# Patient Record
(demographics unavailable — no encounter records)

---

## 2025-01-06 NOTE — PHYSICAL EXAM
[Well Nourished] : well nourished [Alert] : ~L alert [Active] : active [No Allergic Shiners] : no allergic shiners [Tympanic Membranes Clear] : tympanic membranes were clear [No Nasal Drainage] : no nasal drainage [Non-Erythematous] : non-erythematous [No Exudates] : no exudates [Tonsil Size ___] : tonsil size [unfilled] [No Tonsillar Enlargement] : no tonsillar enlargement [Absence Of Retractions] : absence of retractions [No Acc Muscle Use] : no accessory muscle use [Good aeration to bases] : good aeration to bases [Equal Breath Sounds] : equal breath sounds bilaterally [No Crackles] : no crackles [No Rhonchi] : no rhonchi [No Wheezing] : no wheezing [Normal Sinus Rhythm] : normal sinus rhythm

## 2025-01-06 NOTE — HISTORY OF PRESENT ILLNESS
[Stable] : are stable [Wheezing] : wheezing [Difficulty Breathing During Exertion] : dyspnea on exertion [Nasal Passage Blockage (Stuffiness)] : nasal congestion [Fever] : fever [Cough] : coughing [URI] : upper respiratory tract infection [Adherent] : the patient is adherent with ~his/her~ medication regimen [FreeTextEntry1] : 4yo F with h/o ASD, ADHD and RAD here for follow-up. Per mother, patient had a URI 2 months ago, only required use of albuterol for 2-3 days. Patient had persistent symptoms including throat clearing which ahs now improved. Mother giving cetirizine as needed when patient is coughing, last used 1 week ago. Reports compliance with daily Fluticasone. Denies fever or congestion. No recent ED visits or hospitalizations.  [More Frequent Use Needed Recently] : Patient reports no recent increase in frequency of

## 2025-01-06 NOTE — ASSESSMENT
[FreeTextEntry1] : 6yo F with h/o ASD, ADHD and RAD here for follow-up. Patient compliant with medications, and only using albuterol when needed. No current RAD flare.   Problems and Plans ### persistent asthma: daily controller ### exercise asthma : albuterol prior ### allergic rhinitis: nasal steroid Rx, 3 rd gen antihistamine ### eczema: topical steroid prn - Continue Fluticasone BID - Continue Cetirizine PRN and albuterol PRN  - Follow up in 3-4 months

## 2025-02-14 NOTE — PHYSICAL EXAM
[External ears normal] : external ears normal [Normal] : patient has a normal gait [Attention Intact] : attention intact [Easily Distracted] : easily distracted [Needs frequent redirecting] : needs frequent redirecting [Able to redirect] : able to redirect [Difficulty shifting attention or transitioning] : no difficulty shifting attention or transitioning [Fidgets] : fidgets [Moves quickly from one activity to another] : moves quickly from one activity to another [Well-behaved during visit] : not well-behaved during visit [Oppositional] : oppositional [Cooperative when examined] : cooperative when examined [Appropriate eye contact] : appropriate eye contact [Smiles responsively] : smiles responsively [Quiet/calm] : not quiet/calm [Positive mood] : positive mood [Negative mood] : no negative mood [Hypersensitive] : hypersensitive [Answered questions appropriately] : did not answer questions appropriately [Responds to name] : responds to name [Able to follow one step commands] : able to follow one step commands [Echolalia] : no echolalia [Difficult to engage in play] : not difficult to engage in play [de-identified] : -- .ALEX presented with self-directed, attention seeking behaviors. She interupts often seeking attention (showing her dress, asking repetitive questions or rote language). When attention given and simple questions asked, .ALEX is unable to answer replying, "I don't know". .ALEX is quick to whine when limits are set but will stop when instructed by parents or provider. -- .ALEX continued with attention seeking behaviors and when not noticed, began to scream repeatedly. When she paused and noticed no one addressed her behavior she continued and screamed louder. With redirection, .ALEX would pause and say, "I don't want to". At the end of the visit, she was asked to put on her jacket to leave and she then became oppositional replying, "no i don't want to".

## 2025-02-14 NOTE — PLAN
[Findings (To Date)] : Findings from evaluation (to date) [Co-Morbidities] : Clinical disorders and problem commonly associated with this child's condition (now or in the future) [Prognosis] : Prognosis [Dev. Therapies: ____] : Benefits and limits of developmental therapies: [unfilled] [Behavior Modification] : Behavior modification strategies [Resources] : Other available resources [School Re-Eval] : School district re-evaluation [Family Questions] : Family's questions were addressed [Diet] : Evidence-based clinical information about diet [Sleep] : The importance of sleep and strategies to ensure adequate sleep [Media / Screen Time] : Importance of limiting electronics, media, and screen time [Exercise] : Regular exercise [Reading] : Importance of daily reading [Injury Prevention] : injury prevention [Rationale for Medication Discussed] : The rationale for treating inattention, distractibility, hyperactivity, or impulsivity with medication was discussed. The desired effects, possible side effects, and need for monitoring response were reviewed. Information about various medication options was provided.  The option of not treating with medication was also discussed. [Cardiac risk factors for treatment] : Cardiac risk factors for treatment of stimulant medications were reviewed, including history of prior seizure, unexplained loss of consciousness, congenital heart disease, arrhythmias, or family history of sudden unexplained cardiac death in family members below the age of 40. [Medication Deferred] : After discussion with the family, the decision was made to defer consideration of treatment with medication. [FreeTextEntry1] :  Autism-- continue with current IEP and supports. Referral given to parents for CPSE Team to provide SHANICEs [employer] evaluations and 2024-25 IEP in BOTH [place] & English for review. Reinforced the benefits of Home LUIZ & Social Skills programs to help with SHANICEs oppositional behaviors. Parents agreed and local list of LUIZ therapist given. They will call if they need a referral once they find a center that accepts their insurance.   Hyperactivity--Advised to incorporate classroom supports and modifications in NANDO's  IEP development. Reinforced consistent limit setting/boundaries at home. Will continue to monitor.  Speech-- continue with  NANDO's therapy services per her IEP for continued development of articulation and Pragmatic Language Skill development.   Topics discussed with parent, refer to counseling section of note.  .Parent is aware to call the office as needed should any concerns or questions arise otherwise return in 6-8 months.      [Other: _____] : [unfilled] [CSE / IEP] : Committee on Special Education (CSE) evaluations and Individualized Education Programs (IEP)

## 2025-02-14 NOTE — REVIEW OF SYSTEMS
[Wgt Gain] : recent weight gain [Difficulty Falling Asleep] : no difficulty falling asleep [Difficulty Remaining Asleep] : no difficulty remaining asleep [Moodiness] : moodiness [Irritability] : irritability [Normal] : Hematologic/Lymphatic [de-identified] : oppositional-defiant behaviors

## 2025-02-14 NOTE — REASON FOR VISIT
[Follow-Up Visit] : a follow-up visit for [Autism Spectrum Disorder] : autism spectrum disorder [Hyperactivity] : hyperactivity [Attention Problems] : attention problems [Progress with Services] : progress with services [Speech/Language] : speech/language problems [Patient] : patient [Mother] : mother [Father] : father [Other: _____] : [unfilled] [FreeTextEntry4] : NONE [FreeTextEntry3] : Oct 23, 2024

## 2025-02-14 NOTE — REVIEW OF SYSTEMS
[Wgt Gain] : recent weight gain [Difficulty Falling Asleep] : no difficulty falling asleep [Difficulty Remaining Asleep] : no difficulty remaining asleep [Moodiness] : moodiness [Irritability] : irritability [Normal] : Hematologic/Lymphatic [de-identified] : oppositional-defiant behaviors

## 2025-02-14 NOTE — PLAN
[Findings (To Date)] : Findings from evaluation (to date) [Co-Morbidities] : Clinical disorders and problem commonly associated with this child's condition (now or in the future) [Prognosis] : Prognosis [Dev. Therapies: ____] : Benefits and limits of developmental therapies: [unfilled] [Behavior Modification] : Behavior modification strategies [Resources] : Other available resources [School Re-Eval] : School district re-evaluation [Family Questions] : Family's questions were addressed [Diet] : Evidence-based clinical information about diet [Sleep] : The importance of sleep and strategies to ensure adequate sleep [Media / Screen Time] : Importance of limiting electronics, media, and screen time [Exercise] : Regular exercise [Reading] : Importance of daily reading [Injury Prevention] : injury prevention [Rationale for Medication Discussed] : The rationale for treating inattention, distractibility, hyperactivity, or impulsivity with medication was discussed. The desired effects, possible side effects, and need for monitoring response were reviewed. Information about various medication options was provided.  The option of not treating with medication was also discussed. [Cardiac risk factors for treatment] : Cardiac risk factors for treatment of stimulant medications were reviewed, including history of prior seizure, unexplained loss of consciousness, congenital heart disease, arrhythmias, or family history of sudden unexplained cardiac death in family members below the age of 40. [Medication Deferred] : After discussion with the family, the decision was made to defer consideration of treatment with medication. [FreeTextEntry1] :  Autism-- continue with current IEP and supports. Referral given to parents for CPSE Team to provide SHANICEs [employer] evaluations and 2024-25 IEP in BOTH [place] & English for review. Reinforced the benefits of Home LUIZ & Social Skills programs to help with SHANICEs oppositional behaviors. Parents agreed and local list of LUIZ therapist given. They will call if they need a referral once they find a center that accepts their insurance.   Hyperactivity--Advised to incorporate classroom supports and modifications in NANDO's  IEP development. Reinforced consistent limit setting/boundaries at home. Will continue to monitor.  Speech-- continue with  NANDO's therapy services per her IEP for continued development of articulation and Pragmatic Language Skill development.   Topics discussed with parent, refer to counseling section of note.  .Parent is aware to call the office as needed should any concerns or questions arise otherwise return in 6-8 months.      [Other: _____] : [unfilled] [CSE / IEP] : Committee on Special Education (CSE) evaluations and Individualized Education Programs (IEP)

## 2025-02-14 NOTE — PHYSICAL EXAM
[External ears normal] : external ears normal [Normal] : patient has a normal gait [Attention Intact] : attention intact [Easily Distracted] : easily distracted [Needs frequent redirecting] : needs frequent redirecting [Able to redirect] : able to redirect [Difficulty shifting attention or transitioning] : no difficulty shifting attention or transitioning [Fidgets] : fidgets [Moves quickly from one activity to another] : moves quickly from one activity to another [Well-behaved during visit] : not well-behaved during visit [Oppositional] : oppositional [Cooperative when examined] : cooperative when examined [Appropriate eye contact] : appropriate eye contact [Smiles responsively] : smiles responsively [Quiet/calm] : not quiet/calm [Positive mood] : positive mood [Negative mood] : no negative mood [Hypersensitive] : hypersensitive [Answered questions appropriately] : did not answer questions appropriately [Responds to name] : responds to name [Able to follow one step commands] : able to follow one step commands [Echolalia] : no echolalia [Difficult to engage in play] : not difficult to engage in play [de-identified] : -- .ALEX presented with self-directed, attention seeking behaviors. She interupts often seeking attention (showing her dress, asking repetitive questions or rote language). When attention given and simple questions asked, .ALEX is unable to answer replying, "I don't know". .ALEX is quick to whine when limits are set but will stop when instructed by parents or provider. -- .ALEX continued with attention seeking behaviors and when not noticed, began to scream repeatedly. When she paused and noticed no one addressed her behavior she continued and screamed louder. With redirection, .ALEX would pause and say, "I don't want to". At the end of the visit, she was asked to put on her jacket to leave and she then became oppositional replying, "no i don't want to".

## 2025-02-14 NOTE — HISTORY OF PRESENT ILLNESS
[Entering in September] : entering in September [Public] : Public [SC: _____] : self-contained [unfilled] [IEP] : Individualized Education Program [Other: ____] : [unfilled] [PWD] : Preschooler with a Disability [OT: ____] : Occupational Therapy [unfilled] [S-L: _____] : Speech/Language Therapy [unfilled] [BIP] : Behavior Intervention Plan [TA: Other] : Other testing accommodations [12 mos.] : 12 - Month Special Service and/or Program: Yes [Spec. Transportation] : Special Transportation: Yes [No Major Concerns] : No major concerns [Doing Well] : Doing well [FreeTextEntry4] : Sept 2024-- attends [employer]   [FreeTextEntry3] : dated 2/28/2023 (scanned into EMR). Annual review scheduled for 2/27/2024 [FreeTextEntry1] : 2024-25 School Year-- .ALEX attends a full five day in-person learning schedule    [TWNoteComboBox1] :  [de-identified] : improving. Reviewed the state assessment results for NANDO which parents brought. In the beginning of the school year (Fall 2024), NANDO was below grade level in Reading and the Winter 2025 assessment placed her on grade leve and a little above. In math .ALEX was moderately below grade leve in the Fall 2024. By Winter 2025 assesment, NANDO is "emerging" towards grade level.  [de-identified] : Teachers report .ALEX is well behaved in school [de-identified] : No concerns with diet, elimination or sleep.  [de-identified] : .ALEX is oppositional, attention seeking and displays a low frustration level when not getting her way or attention. She then tantrums often with screaming episodes.  [de-identified] : .ALEX will scream at her teenage brother when not given her way or attentoin. [de-identified] : low frustration tolerance with minimal coping strategies or observance of limits & boundaries. [FreeTextEntry6] : Denies any recent illness, accidents, ER visits or hospitalizations since last visit.

## 2025-04-10 NOTE — CONSULT LETTER
[Dear  ___] : Dear  [unfilled], [FreeTextEntry2] : Kate Alcantar MD [address] [place] [address] [FreeTextEntry1] :     04/10/2025   Kate Alcantar MD [address] [place] [address]     RE: ALEX MORRIS MRN:[id number] :2019      Dear Dr. Alcantar       I had the pleasure of meeting ALEX MORRIS and her parents for an initial visit on 04/10/2025. As you know, ALEX is a previously healthy 5 year -year-old girl with autism spectrum disorder who presented with a history of abdominal pain for the past 4 weeks. Reportedly she developed abdominal pain shortly after she had viral illness. Her pain is located in the periumbilical area, described as sharp, non radiating and intermittent in character occurring almost daily. She reportedly had two episodes of vomiting that is described as forceful, non bilious nature. There is no history of diarrhea, fever, weight loss, sleep disturbances, nocturnal awakening, feeding refusal behaviors, irritability. There is a history of increased gas, abdominal distention, flatulence and burping. Her appetite is reported as excellent and her portion size has remained unchanged. Her activity has remained unchanged as well. Her dietary history showed that she consumes carbohydrate predominant diet with not enough fruits and vegetables. She loves sweets, dairy products. For example, she eats yogurt 3 times per day, ice cream twice per week, pizza once a week. Her water intake reportedly is excellent - 16 ounces bottle refilling 5-6 times per day. Her Bms are described as 2 times per day, type3-4, brown in color, small in quantity, and not associated with difficulty. There is no visible blood or mucus in her stools.   Past Medical History: ALEX MORRIS was the product of full term pregnancy with birth weight of 8 pounds ounces and delivered by . Pregnancy was complicated by gestational diabetes, and delivery were described as unremarkable. Information regarding meconium passage is unknown. ALEX MORRIS  was exclusively formula fed and did well during infancy. There is no history of food allergy. she has a history of asthma or eczema. There is no history of past surgery or hospitalization.   Family Medical History: There is no history of celiac disease, type 1 DM, hypothyroidism, IBD or food allergies. The family is of [place] descent.    Recent Test Results:   Review of Systems: 14 point ROS was negative except as noted above.   Physical Examination: General NAD HEENT Atraumatic, normocephaly, PERRLA, red reflex present, EOM normal Neck Supple, no lymphadenopathy or masses Chest Clear to auscultation, no rales or wheezes, RRR, no murmur Abdomen Soft, distended, non-tender, no hepatosplenomegaly, no masses  Normal Joints Normal Neurology Age appropriate reflexes present. DTRs normal. No focal deficits Dermatology No cyanosis, rash, abnormal pigmented lesions Rectal external Deferred Rectal internal Deferred Back Normal alignment Extremity No deformity     In summary, ALEX MORRIS is an 5 year -year-old girl with a history of abdominal pain for the past 4 weeks after having viral illness. Her dietary history is significant for low fiber diet, dairy, carbohydrate and sugar heavy diet susceptible for stool retention. Given the location of her pain, acid peptic disease (including H.pylori) or functional dyspepsia seems less likely. Differential diagnosis should include 1) post-infectious IBS, 2) constipation, 3) lactose malabsorption.   Today, I have recommended the followin) Recommend starting Miralax 1 capful daily.  2) Recommend diet modification (more fiber with water, less binding foods, less sugar, lactose free/dairy free diet) and life style changes (more physical activities). 3) Consider ordering celiac serology and thyroid function tests if her symptoms remain persistent.  4) Follow up in 2 months   Thank you, Dr. KATE ALCANTAR for allowing me to participate in ALEX MORRIS care. Should you have any questions about ALEX , please do not hesitate to contact me at [phone].     This encounter included 60 minutes of face-to-face time and non face to face time to coordinate care. Over half of the face to face time was spent in counseling about the problem outlined above, disease management and patient education. Pertinent test results and plan for management were discussed in detail. All related questions were answered.      Kimberly Fermin MD [facility] [address] [place] [address] Tel: [phone] Fax: [phone]

## 2025-04-10 NOTE — ASSESSMENT
[FreeTextEntry1] : 04/10/2025   RE: ALEX MORRIS MRN:[id number] :2019    I had the pleasure of meeting ALEX MORRIS and her parents for an initial visit on 04/10/2025. As you know, ALEX is a previously healthy 5 year -year-old girl with autism spectrum disorder who presented with a history of abdominal pain for the past 4 weeks. Reportedly she developed abdominal pain shortly after she had viral illness. Her pain is located in the periumbilical area, described as sharp, non radiating and intermittent in character occurring almost daily. She reportedly had two episodes of vomiting that is described as forceful, non bilious nature. There is no history of diarrhea, fever, weight loss, sleep disturbances, nocturnal awakening, feeding refusal behaviors, irritability. There is a history of increased gas, abdominal distention, flatulence and burping. Her appetite is reported as excellent and her portion size has remained unchanged. Her activity has remained unchanged as well. Her dietary history showed that she consumes carbohydrate predominant diet with not enough fruits and vegetables. She loves sweets, dairy products. For example, she eats yogurt 3 times per day, ice cream twice per week, pizza once a week. Her water intake reportedly is excellent - 16 ounces bottle refilling 5-6 times per day. Her Bms are described as 2 times per day, type3-4, brown in color, small in quantity, and not associated with difficulty. There is no visible blood or mucus in her stools.   Past Medical History: ALEX MORRIS was the product of full term pregnancy with birth weight of 8 pounds ounces and delivered by . Pregnancy was complicated by gestational diabetes, and delivery were described as unremarkable. Information regarding meconium passage is unknown. ALEX MORRIS  was exclusively formula fed and did well during infancy. There is no history of food allergy. she has a history of asthma or eczema. There is no history of past surgery or hospitalization.   Family Medical History: There is no history of celiac disease, type 1 DM, hypothyroidism, IBD or food allergies. The family is of [place] descent.    Recent Test Results:   Review of Systems: 14 point ROS was negative except as noted above.   Physical Examination: General NAD HEENT Atraumatic, normocephaly, PERRLA, red reflex present, EOM normal Neck Supple, no lymphadenopathy or masses Chest Clear to auscultation, no rales or wheezes, RRR, no murmur Abdomen Soft, distended, non-tender, no hepatosplenomegaly, no masses  Normal Joints Normal Neurology Age appropriate reflexes present. DTRs normal. No focal deficits Dermatology No cyanosis, rash, abnormal pigmented lesions Rectal external Deferred Rectal internal Deferred Back Normal alignment Extremity No deformity     In summary, ALEX MORRIS is an 5 year -year-old girl with a history of abdominal pain for the past 4 weeks after having viral illness. Her dietary history is significant for low fiber diet, dairy, carbohydrate and sugar heavy diet susceptible for stool retention. Given the location of her pain, acid peptic disease (including H.pylori) or functional dyspepsia seems less likely. Differential diagnosis should include 1) post-infectious IBS, 2) constipation, 3) lactose malabsorption.   Today, I have recommended the followin) Recommend starting Miralax 1 capful daily.  2) Recommend diet modification (more fiber with water, less binding foods, less sugar, lactose free/dairy free diet) and life style changes (more physical activities). 3) Consider ordering celiac serology and thyroid function tests if her symptoms remain persistent.  4) Follow up in 2 months   This encounter included 60 minutes of face-to-face time and non face to face time to coordinate care. Over half of the face to face time was spent in counseling about the problem outlined above, disease management and patient education. Pertinent test results and plan for management were discussed in detail. All related questions were answered.     Kimberly Fermin MD [facility] [address] [place] [address] Tel: [phone] Fax: [phone]

## 2025-04-10 NOTE — ASSESSMENT
[FreeTextEntry1] : 04/10/2025   RE: ALEX MORRIS MRN:[id number] :2019    I had the pleasure of meeting ALEX MORRIS and her parents for an initial visit on 04/10/2025. As you know, ALEX is a previously healthy 5 year -year-old girl with autism spectrum disorder who presented with a history of abdominal pain for the past 4 weeks. Reportedly she developed abdominal pain shortly after she had viral illness. Her pain is located in the periumbilical area, described as sharp, non radiating and intermittent in character occurring almost daily. She reportedly had two episodes of vomiting that is described as forceful, non bilious nature. There is no history of diarrhea, fever, weight loss, sleep disturbances, nocturnal awakening, feeding refusal behaviors, irritability. There is a history of increased gas, abdominal distention, flatulence and burping. Her appetite is reported as excellent and her portion size has remained unchanged. Her activity has remained unchanged as well. Her dietary history showed that she consumes carbohydrate predominant diet with not enough fruits and vegetables. She loves sweets, dairy products. For example, she eats yogurt 3 times per day, ice cream twice per week, pizza once a week. Her water intake reportedly is excellent - 16 ounces bottle refilling 5-6 times per day. Her Bms are described as 2 times per day, type3-4, brown in color, small in quantity, and not associated with difficulty. There is no visible blood or mucus in her stools.   Past Medical History: ALEX MORRIS was the product of full term pregnancy with birth weight of 8 pounds ounces and delivered by . Pregnancy was complicated by gestational diabetes, and delivery were described as unremarkable. Information regarding meconium passage is unknown. ALEX MORRIS  was exclusively formula fed and did well during infancy. There is no history of food allergy. she has a history of asthma or eczema. There is no history of past surgery or hospitalization.   Family Medical History: There is no history of celiac disease, type 1 DM, hypothyroidism, IBD or food allergies. The family is of [place] descent.    Recent Test Results:   Review of Systems: 14 point ROS was negative except as noted above.   Physical Examination: General NAD HEENT Atraumatic, normocephaly, PERRLA, red reflex present, EOM normal Neck Supple, no lymphadenopathy or masses Chest Clear to auscultation, no rales or wheezes, RRR, no murmur Abdomen Soft, distended, non-tender, no hepatosplenomegaly, no masses  Normal Joints Normal Neurology Age appropriate reflexes present. DTRs normal. No focal deficits Dermatology No cyanosis, rash, abnormal pigmented lesions Rectal external Deferred Rectal internal Deferred Back Normal alignment Extremity No deformity     In summary, ALEX MORRIS is an 5 year -year-old girl with a history of abdominal pain for the past 4 weeks after having viral illness. Her dietary history is significant for low fiber diet, dairy, carbohydrate and sugar heavy diet susceptible for stool retention. Given the location of her pain, acid peptic disease (including H.pylori) or functional dyspepsia seems less likely. Differential diagnosis should include 1) post-infectious IBS, 2) constipation, 3) lactose malabsorption.   Today, I have recommended the followin) Recommend starting Miralax 1 capful daily.  2) Recommend diet modification (more fiber with water, less binding foods, less sugar, lactose free/dairy free diet) and life style changes (more physical activities). 3) Consider ordering celiac serology and thyroid function tests if her symptoms remain persistent.  4) Follow up in 2 months   This encounter included 60 minutes of face-to-face time and non face to face time to coordinate care. Over half of the face to face time was spent in counseling about the problem outlined above, disease management and patient education. Pertinent test results and plan for management were discussed in detail. All related questions were answered.     Kimberly Fermin MD Pediatric Gastroenterology [address] [place] [address] Tel: [phone] Fax: [phone]

## 2025-04-10 NOTE — CONSULT LETTER
[Dear  ___] : Dear  [unfilled], [FreeTextEntry2] : Kate Alcantar MD [address] [place] [address] [FreeTextEntry1] :     04/10/2025   Kate Alcantar MD [address] [place] [address]     RE: ALEX MORRIS MRN:[id number] :2019      Dear Dr. Alcantar       I had the pleasure of meeting ALEX MORRIS and her parents for an initial visit on 04/10/2025. As you know, ALEX is a previously healthy 5 year -year-old girl with autism spectrum disorder who presented with a history of abdominal pain for the past 4 weeks. Reportedly she developed abdominal pain shortly after she had viral illness. Her pain is located in the periumbilical area, described as sharp, non radiating and intermittent in character occurring almost daily. She reportedly had two episodes of vomiting that is described as forceful, non bilious nature. There is no history of diarrhea, fever, weight loss, sleep disturbances, nocturnal awakening, feeding refusal behaviors, irritability. There is a history of increased gas, abdominal distention, flatulence and burping. Her appetite is reported as excellent and her portion size has remained unchanged. Her activity has remained unchanged as well. Her dietary history showed that she consumes carbohydrate predominant diet with not enough fruits and vegetables. She loves sweets, dairy products. For example, she eats yogurt 3 times per day, ice cream twice per week, pizza once a week. Her water intake reportedly is excellent - 16 ounces bottle refilling 5-6 times per day. Her Bms are described as 2 times per day, type3-4, brown in color, small in quantity, and not associated with difficulty. There is no visible blood or mucus in her stools.   Past Medical History: ALEX MORRIS was the product of full term pregnancy with birth weight of 8 pounds ounces and delivered by . Pregnancy was complicated by gestational diabetes, and delivery were described as unremarkable. Information regarding meconium passage is unknown. ALEX MORRIS  was exclusively formula fed and did well during infancy. There is no history of food allergy. she has a history of asthma or eczema. There is no history of past surgery or hospitalization.   Family Medical History: There is no history of celiac disease, type 1 DM, hypothyroidism, IBD or food allergies. The family is of [place] descent.    Recent Test Results:   Review of Systems: 14 point ROS was negative except as noted above.   Physical Examination: General NAD HEENT Atraumatic, normocephaly, PERRLA, red reflex present, EOM normal Neck Supple, no lymphadenopathy or masses Chest Clear to auscultation, no rales or wheezes, RRR, no murmur Abdomen Soft, distended, non-tender, no hepatosplenomegaly, no masses  Normal Joints Normal Neurology Age appropriate reflexes present. DTRs normal. No focal deficits Dermatology No cyanosis, rash, abnormal pigmented lesions Rectal external Deferred Rectal internal Deferred Back Normal alignment Extremity No deformity     In summary, ALEX MORRIS is an 5 year -year-old girl with a history of abdominal pain for the past 4 weeks after having viral illness. Her dietary history is significant for low fiber diet, dairy, carbohydrate and sugar heavy diet susceptible for stool retention. Given the location of her pain, acid peptic disease (including H.pylori) or functional dyspepsia seems less likely. Differential diagnosis should include 1) post-infectious IBS, 2) constipation, 3) lactose malabsorption.   Today, I have recommended the followin) Recommend starting Miralax 1 capful daily.  2) Recommend diet modification (more fiber with water, less binding foods, less sugar, lactose free/dairy free diet) and life style changes (more physical activities). 3) Consider ordering celiac serology and thyroid function tests if her symptoms remain persistent.  4) Follow up in 2 months   Thank you, Dr. KATE ALCANTAR for allowing me to participate in ALEX MORRIS care. Should you have any questions about ALEX , please do not hesitate to contact me at [phone].     This encounter included 60 minutes of face-to-face time and non face to face time to coordinate care. Over half of the face to face time was spent in counseling about the problem outlined above, disease management and patient education. Pertinent test results and plan for management were discussed in detail. All related questions were answered.      Kimberly Fermin MD Pediatric Gastroenterology [address] [place] [address] Tel: [phone] Fax: [phone]

## 2025-05-28 NOTE — ASSESSMENT
[FreeTextEntry1] : discuss with parents 1 Patient was followed for mild persistent asthma The symptoms are  well controlled : 2 PND c/w AR  Problems and Plans ### persistent asthma: daily controller ### exercise asthma : albuterol prior ### allergic rhinitis: nasal steroid Rx, 3 rd gen antihistamine ### PND and cough Montelukast 4mg for 2 to 4  weeks  asthma education  was reinforced: # referral for educator # pathology of disease,  use of inhaler   +  spacer   + mask;       technique is checked :  # trigger control;  environmental control avoidance tobacco and all other smoking compliance d/w importance of compliance and danger of non adherence # ;Action plan,  [facility] school # d/w s/e of Rx:   psychological s/e of montelukast, adult height reduction etc of ICS # CDC recommended vaccines discussed

## 2025-05-28 NOTE — REASON FOR VISIT
[Routine Follow-Up] : a routine follow-up visit for [Mother] : mother [Language Line ] : provided by Language Line   [Interpreters_IDNumber] : [id number] [Interpreters_FullName] : Philip [TWNoteComboBox1] : Turkish

## 2025-05-28 NOTE — CONSULT LETTER
[Dear  ___] : Dear  [unfilled], [Consult Letter:] : I had the pleasure of evaluating your patient, [unfilled]. [Please see my note below.] : Please see my note below. [Sincerely,] : Sincerely, [FreeTextEntry3] : Guardians are asked to contact us if there is any concern or questions. The next appointment will be in  8 to 14 weeks Thank you for your referral and the opportunity to contribute to the care of this precious family. Please feel free to contact me at any time, if I may be of further assistance.  Dhaval BEARD FCCP Director of Pediatric Pulmonology Diplomate, Pediatric Pulmonology, American Board of Pediatrics Diplomate, Sleep medicine, American Board of  Pediatrics [facility]

## 2025-05-28 NOTE — HISTORY OF PRESENT ILLNESS
[FreeTextEntry1] : Patient was followed for mild persistent asthma The symptoms are  well controlled : Patient is by report          compliant with controller RX  No hospitalization, emergency department, urgent care, unscheduled PMD visit for asthma, no systemic steroid, no absence from school// parents take leave because of pt asthma.  symptoms are          controlled by RUL[employer] OF [employer]'s  MOC report patient is recently frequently clearing her throat for 4 weeks

## 2025-05-28 NOTE — PHYSICAL EXAM
[Well Nourished] : well nourished [Well Developed] : well developed [Alert] : ~L alert [Active] : active [Normal Breathing Pattern] : normal breathing pattern [No Respiratory Distress] : no respiratory distress [No Allergic Shiners] : no allergic shiners [No Drainage] : no drainage [No Conjunctivitis] : no conjunctivitis [Tympanic Membranes Clear] : tympanic membranes were clear [Nasal Mucosa Non-Edematous] : nasal mucosa non-edematous [No Nasal Drainage] : no nasal drainage [No Polyps] : no polyps [No Sinus Tenderness] : no sinus tenderness [No Oral Pallor] : no oral pallor [No Oral Cyanosis] : no oral cyanosis [Non-Erythematous] : non-erythematous [No Exudates] : no exudates [No Postnasal Drip] : no postnasal drip [No Tonsillar Enlargement] : no tonsillar enlargement [Absence Of Retractions] : absence of retractions [Symmetric] : symmetric [Good Expansion] : good expansion [No Acc Muscle Use] : no accessory muscle use [Good aeration to bases] : good aeration to bases [Equal Breath Sounds] : equal breath sounds bilaterally [No Crackles] : no crackles [No Rhonchi] : no rhonchi [No Wheezing] : no wheezing [Normal Sinus Rhythm] : normal sinus rhythm [No Heart Murmur] : no heart murmur [Soft, Non-Tender] : soft, non-tender [No Hepatosplenomegaly] : no hepatosplenomegaly [Non Distended] : was not ~L distended [Abdomen Mass (___ Cm)] : no abdominal mass palpated [Full ROM] : full range of motion [No Clubbing] : no clubbing [Capillary Refill < 2 secs] : capillary refill less than two seconds [No Cyanosis] : no cyanosis [No Petechiae] : no petechiae [No Kyphoscoliosis] : no kyphoscoliosis [No Contractures] : no contractures [Alert and  Oriented] : alert and oriented [No Abnormal Focal Findings] : no abnormal focal findings [Normal Muscle Tone And Reflexes] : normal muscle tone and reflexes [No Birth Marks] : no birth marks [No Rashes] : no rashes [No Skin Lesions] : no skin lesions [FreeTextEntry1] : clearing throat seen c/w PND

## 2025-06-01 NOTE — HISTORY OF PRESENT ILLNESS
[Entering in September] : entering in September [Public] : Public [SC: _____] : self-contained [unfilled] [IEP] : Individualized Education Program [Other: ____] : [unfilled] [PWD] : Preschooler with a Disability [OT: ____] : Occupational Therapy [unfilled] [S-L: _____] : Speech/Language Therapy [unfilled] [BIP] : Behavior Intervention Plan [TA: Other] : Other testing accommodations [12 mos.] : 12 - Month Special Service and/or Program: Yes [Spec. Transportation] : Special Transportation: Yes [No Major Concerns] : No major concerns [Doing Well] : Doing well [FreeTextEntry4] : Sept 2024-- attends [employer]   room air [FreeTextEntry3] : dated 2/28/2023 (scanned into EMR). Annual review scheduled for 2/27/2024 [FreeTextEntry1] : 2024-25 School Year-- .ALEX attends a full five day in-person learning schedule    [TWNoteComboBox1] :  [de-identified] : improving. Reviewed the state assessment results for NANDO which parents brought. In the beginning of the school year (Fall 2024), NANDO was below grade level in Reading and the Winter 2025 assessment placed her on grade leve and a little above. In math .ALEX was moderately below grade leve in the Fall 2024. By Winter 2025 assesment, NANDO is "emerging" towards grade level.  [de-identified] : Teachers report .ALEX is well behaved in school [de-identified] : No concerns with diet, elimination or sleep.  [de-identified] : .ALEX is oppositional, attention seeking and displays a low frustration level when not getting her way or attention. She then tantrums often with screaming episodes.  [de-identified] : .ALEX will scream at her teenage brother when not given her way or attentoin. [de-identified] : low frustration tolerance with minimal coping strategies or observance of limits & boundaries. [FreeTextEntry6] : Denies any recent illness, accidents, ER visits or hospitalizations since last visit.

## 2025-06-12 NOTE — CONSULT LETTER
[FreeTextEntry2] : Kate Alcantar MD [address] [place] [address] [FreeTextEntry1] : 2025  Kate Luna MD [address] [place] [address]  RE: ALEX MORRIS MRN:[id number] :2019   Dear Dr. Luna  I saw Alex Morris and her parents for a follow-up visit on 2025. As you know, Alex is a previously healthy 5-year-old girl with autism spectrum disorder, who has a history of abdominal pain for 4 weeks, likely associated with constipation. She was last seen on April 10, 2025. Since the last visit, she has been taking Miralax 1 capful daily and has not had any abdominal pain. Her appetite and activity level have returned to baseline. Her bowel movements are regular, occurring 1-2 times per day, described as type 4-6, brown in color, and not associated with difficulty. There is no blood or mucus in the stools. The family has successfully implemented increased fiber and fluid intake. She is consuming Lactaid milk with cereal only 1-2 times per week, and has avoided cheese entirely. The family is pleased that Alex is no longer experiencing abdominal pain while on Miralax.  Review of Systems: 14 point ROS was negative except as noted above.  Physical Examination: General NAD HEENT Atraumatic, normocephaly, PERRLA, red reflex present, EOM normal Neck Supple, no lymphadenopathy or masses Chest Clear to auscultation, no rales or wheezes, RRR, no murmur Abdomen Soft, distended, non-tender, no hepatosplenomegaly, no masses  Normal Joints Normal Neurology Age appropriate reflexes present. DTRs normal. No focal deficits Dermatology No cyanosis, rash, abnormal pigmented lesions Rectal external Deferred Rectal internal Deferred Back Normal alignment Extremity No deformity   Alex Morris is a 5-year-old girl with a history of abdominal pain lasting 4 weeks, likely related to constipation. Since her last visit, she has been asymptomatic while on Miralax. I have recommended continuing dietary modifications, including increased fiber and water intake and reduced dairy consumption. I also advised tapering Miralax to 1 capful alternating with  capful daily. Additionally, I recommended a visit with a dietitian to support the family in developing a diet that will best meet Alex's needs. A follow-up visit in 3 months is advised to monitor her progress and adjust the plan as needed.   Thank you, Dr. KATE LUNA for allowing me to participate in ALEX MORRIS care. Should you have any questions about ALEX , please do not hesitate to contact me at [phone].   This encounter included 60 minutes of face-to-face time and non face to face time to coordinate care. Over half of the face to face time was spent in counseling about the problem outlined above, disease management and patient education. Pertinent test results and plan for management were discussed in detail. All related questions were answered.    Kimberly Fermin MD Pediatric Gastroenterology [address] [place] [address] Tel: [phone] Fax: [phone].

## 2025-06-12 NOTE — ASSESSMENT
[FreeTextEntry1] : 2025  Kate Luna MD [address] [place] [address]  RE: ALEX MORRIS MRN:[id number] :2019   Dear Dr. Luna  I saw Alex Morris and her parents for a follow-up visit on 2025. As you know, Alex is a previously healthy 5-year-old girl with autism spectrum disorder, who has a history of abdominal pain for 4 weeks, likely associated with constipation. She was last seen on April 10, 2025. Since the last visit, she has been taking Miralax 1 capful daily and has not had any abdominal pain. Her appetite and activity level have returned to baseline. Her bowel movements are regular, occurring 1-2 times per day, described as type 4-6, brown in color, and not associated with difficulty. There is no blood or mucus in the stools. The family has successfully implemented increased fiber and fluid intake. She is consuming Lactaid milk with cereal only 1-2 times per week, and has avoided cheese entirely. The family is pleased that Alex is no longer experiencing abdominal pain while on Miralax.  Review of Systems: 14 point ROS was negative except as noted above.  Physical Examination: General NAD HEENT Atraumatic, normocephaly, PERRLA, red reflex present, EOM normal Neck Supple, no lymphadenopathy or masses Chest Clear to auscultation, no rales or wheezes, RRR, no murmur Abdomen Soft, distended, non-tender, no hepatosplenomegaly, no masses  Normal Joints Normal Neurology Age appropriate reflexes present. DTRs normal. No focal deficits Dermatology No cyanosis, rash, abnormal pigmented lesions Rectal external Deferred Rectal internal Deferred Back Normal alignment Extremity No deformity   Alex Morris is a 5-year-old girl with a history of abdominal pain lasting 4 weeks, likely related to constipation. Since her last visit, she has been asymptomatic while on Miralax. I have recommended continuing dietary modifications, including increased fiber and water intake and reduced dairy consumption. I also advised tapering Miralax to 1 capful alternating with  capful daily. Additionally, I recommended a visit with a dietitian to support the family in developing a diet that will best meet Alex's needs. A follow-up visit in 3 months is advised to monitor her progress and adjust the plan as needed.   Thank you, Dr. KATE LUNA for allowing me to participate in ALEX MORRIS care. Should you have any questions about ALEX , please do not hesitate to contact me at [phone].   This encounter included 60 minutes of face-to-face time and non face to face time to coordinate care. Over half of the face to face time was spent in counseling about the problem outlined above, disease management and patient education. Pertinent test results and plan for management were discussed in detail. All related questions were answered.    Kimberly Fermin MD Pediatric Gastroenterology [address] [place] [address] Tel: [phone] Fax: [phone].

## 2025-06-12 NOTE — ASSESSMENT
[FreeTextEntry1] : 2025  Kate Luna MD [address] [place] [address]  RE: ALEX MORRIS MRN:[id number] :2019   Dear Dr. Luna  I saw Alex Morris and her parents for a follow-up visit on 2025. As you know, Alex is a previously healthy 5-year-old girl with autism spectrum disorder, who has a history of abdominal pain for 4 weeks, likely associated with constipation. She was last seen on April 10, 2025. Since the last visit, she has been taking Miralax 1 capful daily and has not had any abdominal pain. Her appetite and activity level have returned to baseline. Her bowel movements are regular, occurring 1-2 times per day, described as type 4-6, brown in color, and not associated with difficulty. There is no blood or mucus in the stools. The family has successfully implemented increased fiber and fluid intake. She is consuming Lactaid milk with cereal only 1-2 times per week, and has avoided cheese entirely. The family is pleased that Alex is no longer experiencing abdominal pain while on Miralax.  Review of Systems: 14 point ROS was negative except as noted above.  Physical Examination: General NAD HEENT Atraumatic, normocephaly, PERRLA, red reflex present, EOM normal Neck Supple, no lymphadenopathy or masses Chest Clear to auscultation, no rales or wheezes, RRR, no murmur Abdomen Soft, distended, non-tender, no hepatosplenomegaly, no masses  Normal Joints Normal Neurology Age appropriate reflexes present. DTRs normal. No focal deficits Dermatology No cyanosis, rash, abnormal pigmented lesions Rectal external Deferred Rectal internal Deferred Back Normal alignment Extremity No deformity   Alex Morris is a 5-year-old girl with a history of abdominal pain lasting 4 weeks, likely related to constipation. Since her last visit, she has been asymptomatic while on Miralax. I have recommended continuing dietary modifications, including increased fiber and water intake and reduced dairy consumption. I also advised tapering Miralax to 1 capful alternating with  capful daily. Additionally, I recommended a visit with a dietitian to support the family in developing a diet that will best meet Alex's needs. A follow-up visit in 3 months is advised to monitor her progress and adjust the plan as needed.   Thank you, Dr. KATE LUNA for allowing me to participate in ALEX MORRIS care. Should you have any questions about ALEX , please do not hesitate to contact me at [phone].   This encounter included 60 minutes of face-to-face time and non face to face time to coordinate care. Over half of the face to face time was spent in counseling about the problem outlined above, disease management and patient education. Pertinent test results and plan for management were discussed in detail. All related questions were answered.    Kimberly Fermin MD Pediatric Gastroenterology [address] [place] [address] Tel: [phone] Fax: [phone].

## 2025-06-12 NOTE — CONSULT LETTER
[FreeTextEntry2] : Kate Alcantar MD [address] [place] [address] [FreeTextEntry1] : 2025  Kate Luna MD [address] [place] [address]  RE: ALEX MORRIS [id number] :2019   Dear Dr. Luna  I saw Alex Morris and her parents for a follow-up visit on 2025. As you know, Alex is a previously healthy 5-year-old girl with autism spectrum disorder, who has a history of abdominal pain for 4 weeks, likely associated with constipation. She was last seen on April 10, 2025. Since the last visit, she has been taking Miralax 1 capful daily and has not had any abdominal pain. Her appetite and activity level have returned to baseline. Her bowel movements are regular, occurring 1-2 times per day, described as type 4-6, brown in color, and not associated with difficulty. There is no blood or mucus in the stools. The family has successfully implemented increased fiber and fluid intake. She is consuming Lactaid milk with cereal only 1-2 times per week, and has avoided cheese entirely. The family is pleased that Alex is no longer experiencing abdominal pain while on Miralax.  Review of Systems: 14 point ROS was negative except as noted above.  Physical Examination: General NAD HEENT Atraumatic, normocephaly, PERRLA, red reflex present, EOM normal Neck Supple, no lymphadenopathy or masses Chest Clear to auscultation, no rales or wheezes, RRR, no murmur Abdomen Soft, distended, non-tender, no hepatosplenomegaly, no masses  Normal Joints Normal Neurology Age appropriate reflexes present. DTRs normal. No focal deficits Dermatology No cyanosis, rash, abnormal pigmented lesions Rectal external Deferred Rectal internal Deferred Back Normal alignment Extremity No deformity   Alex Morris is a 5-year-old girl with a history of abdominal pain lasting 4 weeks, likely related to constipation. Since her last visit, she has been asymptomatic while on Miralax. I have recommended continuing dietary modifications, including increased fiber and water intake and reduced dairy consumption. I also advised tapering Miralax to 1 capful alternating with  capful daily. Additionally, I recommended a visit with a dietitian to support the family in developing a diet that will best meet Alex's needs. A follow-up visit in 3 months is advised to monitor her progress and adjust the plan as needed.   Thank you, Dr. KATE LUNA for allowing me to participate in ALEX MORRIS care. Should you have any questions about ALEX , please do not hesitate to contact me at [phone].   This encounter included 60 minutes of face-to-face time and non face to face time to coordinate care. Over half of the face to face time was spent in counseling about the problem outlined above, disease management and patient education. Pertinent test results and plan for management were discussed in detail. All related questions were answered.    Kimberly Fermin MD Pediatric Gastroenterology [address] [place] [address] Tel: [phone] Fax: [phone].